# Patient Record
Sex: FEMALE | Race: WHITE
[De-identification: names, ages, dates, MRNs, and addresses within clinical notes are randomized per-mention and may not be internally consistent; named-entity substitution may affect disease eponyms.]

---

## 2018-05-31 ENCOUNTER — HOSPITAL ENCOUNTER (OUTPATIENT)
Dept: HOSPITAL 8 - RAD | Age: 78
Discharge: HOME | End: 2018-05-31
Attending: NURSE PRACTITIONER
Payer: MEDICARE
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
## 2018-05-31 DIAGNOSIS — R07.81: Primary | ICD-10-CM
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
## 2019-08-02 ENCOUNTER — HOSPITAL ENCOUNTER (EMERGENCY)
Dept: HOSPITAL 8 - ED | Age: 79
Discharge: HOME | End: 2019-08-02
Payer: MEDICARE

## 2019-08-02 VITALS — SYSTOLIC BLOOD PRESSURE: 147 MMHG | DIASTOLIC BLOOD PRESSURE: 68 MMHG

## 2019-08-02 VITALS — BODY MASS INDEX: 23.94 KG/M2 | HEIGHT: 62 IN | WEIGHT: 130.07 LBS

## 2019-08-02 DIAGNOSIS — M54.9: ICD-10-CM

## 2019-08-02 DIAGNOSIS — G89.29: Primary | ICD-10-CM

## 2019-08-02 DIAGNOSIS — I10: ICD-10-CM

## 2019-08-02 DIAGNOSIS — M19.90: ICD-10-CM

## 2019-08-02 PROCEDURE — 99283 EMERGENCY DEPT VISIT LOW MDM: CPT

## 2019-08-02 NOTE — NUR
PT WC'D TO ROOM 39 W/ C/O BILAT SHOULDER PAIN, BILAT HIP PAIN AND NOW HAS C/O 
BACK PAIN. STATES SHE HAS A PAIN DR AND WAS ON NORCO 10/325 5 TIMES A DAY FOR 
YEARS AND WENT TO HER NEW PCP [facility] AND WAS CHANGED TO MORPHINE 15 MG 5 
TIMES A DAY. STATES PAIN HAS NOT BEEN CONTROLLED W/ MORPHINE. PT STATES SHE 
CAME TO ED TODAY "I'M TIRED OF BEING IN PAIN". DENIES GLF. PT RESTING ON 
GURNEY. NADN. MONITORS APPLIED. ERP DR. FRANCIS AT BEDSIDE.

## 2020-12-26 ENCOUNTER — HOSPITAL ENCOUNTER (EMERGENCY)
Dept: HOSPITAL 8 - ED | Age: 80
Discharge: HOME | End: 2020-12-26
Payer: MEDICARE

## 2020-12-26 VITALS — HEIGHT: 62 IN | BODY MASS INDEX: 20.28 KG/M2 | WEIGHT: 110.23 LBS

## 2020-12-26 VITALS — SYSTOLIC BLOOD PRESSURE: 129 MMHG | DIASTOLIC BLOOD PRESSURE: 81 MMHG
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
## 2020-12-26 DIAGNOSIS — Z90.89: ICD-10-CM
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
## 2020-12-26 DIAGNOSIS — M19.012: ICD-10-CM

## 2020-12-26 DIAGNOSIS — S40.012A: Primary | ICD-10-CM

## 2020-12-26 DIAGNOSIS — J44.9: ICD-10-CM

## 2020-12-26 DIAGNOSIS — I10: ICD-10-CM
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
## 2020-12-26 PROCEDURE — 93005 ELECTROCARDIOGRAM TRACING: CPT

## 2020-12-26 PROCEDURE — 71045 X-RAY EXAM CHEST 1 VIEW: CPT

## 2020-12-26 PROCEDURE — 29105 APPLICATION LONG ARM SPLINT: CPT

## 2020-12-26 PROCEDURE — 96372 THER/PROPH/DIAG INJ SC/IM: CPT

## 2020-12-26 PROCEDURE — 73030 X-RAY EXAM OF SHOULDER: CPT

## 2020-12-26 PROCEDURE — 99284 EMERGENCY DEPT VISIT MOD MDM: CPT

## 2020-12-26 NOTE — NUR
Patient and Caregiver given sling/discharge instructions and they have 
confirmed that they understand the instructions.  Patient left facility via 
wheelchair and [facility] transport.

## 2020-12-26 NOTE — NUR
RECVD REPORT FROM RIO NG, ASSUMED CARE OF PATIENT/ DISCUSSED PLAN OF CARE 
WITH PATIENT AND DAUGHTER. UPDATED VITALS, CLEANED PATIENT UP. CALL LIGHT 
WITHIN REACH, PATIENT RESTING COMFORTABLY WITH DAUGHTER AT BEDSIDE

## 2020-12-26 NOTE — NUR
RECEIVED REPORT FROM BENITA. PT LAYING ON GURNEY WITH EYES CLOSED, RESPONDS 
APPROP TO STAFF, NAD, COMFORT MEASURES PROVIDED, FAMILY AT BS, CALL LIGHT 
WITHIN REACH.